# Patient Record
Sex: FEMALE | Race: OTHER | NOT HISPANIC OR LATINO | Employment: UNEMPLOYED | URBAN - METROPOLITAN AREA
[De-identification: names, ages, dates, MRNs, and addresses within clinical notes are randomized per-mention and may not be internally consistent; named-entity substitution may affect disease eponyms.]

---

## 2021-04-03 ENCOUNTER — HOSPITAL ENCOUNTER (EMERGENCY)
Facility: HOSPITAL | Age: 66
Discharge: HOME | End: 2021-04-03
Attending: EMERGENCY MEDICINE
Payer: MEDICARE

## 2021-04-03 VITALS
WEIGHT: 170 LBS | HEIGHT: 63 IN | SYSTOLIC BLOOD PRESSURE: 116 MMHG | BODY MASS INDEX: 30.12 KG/M2 | HEART RATE: 64 BPM | DIASTOLIC BLOOD PRESSURE: 73 MMHG | RESPIRATION RATE: 18 BRPM | TEMPERATURE: 99 F | OXYGEN SATURATION: 97 %

## 2021-04-03 DIAGNOSIS — M25.572 ACUTE LEFT ANKLE PAIN: Primary | ICD-10-CM

## 2021-04-03 DIAGNOSIS — M79.672 LEFT FOOT PAIN: ICD-10-CM

## 2021-04-03 DIAGNOSIS — S99.912A LEFT ANKLE INJURY: ICD-10-CM

## 2021-04-03 PROCEDURE — 25000003 PHARM REV CODE 250: Performed by: NURSE PRACTITIONER

## 2021-04-03 PROCEDURE — 99283 EMERGENCY DEPT VISIT LOW MDM: CPT | Mod: 25

## 2021-04-03 RX ORDER — ATORVASTATIN CALCIUM 40 MG/1
1 TABLET, FILM COATED ORAL
COMMUNITY
Start: 2021-02-08

## 2021-04-03 RX ORDER — FUROSEMIDE 20 MG/1
20 TABLET ORAL DAILY
COMMUNITY
Start: 2021-02-09

## 2021-04-03 RX ORDER — ENALAPRIL MALEATE 20 MG/1
1 TABLET ORAL
COMMUNITY
Start: 2021-02-08

## 2021-04-03 RX ORDER — OXYCODONE AND ACETAMINOPHEN 5; 325 MG/1; MG/1
1 TABLET ORAL
Status: COMPLETED | OUTPATIENT
Start: 2021-04-03 | End: 2021-04-03

## 2021-04-03 RX ORDER — OMEPRAZOLE 40 MG/1
1 CAPSULE, DELAYED RELEASE ORAL
COMMUNITY
Start: 2020-07-08

## 2021-04-03 RX ORDER — ATENOLOL 100 MG/1
1 TABLET ORAL
COMMUNITY
Start: 2021-02-08

## 2021-04-03 RX ORDER — METFORMIN HYDROCHLORIDE 1000 MG/1
1 TABLET ORAL
COMMUNITY
Start: 2021-02-08

## 2021-04-03 RX ORDER — EMPAGLIFLOZIN 10 MG/1
1 TABLET, FILM COATED ORAL
COMMUNITY
Start: 2021-02-08

## 2021-04-03 RX ORDER — GLIPIZIDE 10 MG/1
1 TABLET ORAL
COMMUNITY
Start: 2021-02-08

## 2021-04-03 RX ORDER — DULAGLUTIDE 1.5 MG/.5ML
1.5 INJECTION, SOLUTION SUBCUTANEOUS
COMMUNITY
Start: 2020-12-29

## 2021-04-03 RX ADMIN — OXYCODONE HYDROCHLORIDE AND ACETAMINOPHEN 1 TABLET: 5; 325 TABLET ORAL at 02:04

## 2021-04-07 ENCOUNTER — IMMUNIZATION (OUTPATIENT)
Dept: OBSTETRICS AND GYNECOLOGY | Facility: CLINIC | Age: 66
End: 2021-04-07
Payer: MEDICARE

## 2021-04-07 DIAGNOSIS — Z23 NEED FOR VACCINATION: Primary | ICD-10-CM

## 2021-04-07 PROCEDURE — 0001A COVID-19, MRNA, LNP-S, PF, 30 MCG/0.3 ML DOSE VACCINE: ICD-10-PCS | Mod: CV19,S$GLB,, | Performed by: FAMILY MEDICINE

## 2021-04-07 PROCEDURE — 91300 COVID-19, MRNA, LNP-S, PF, 30 MCG/0.3 ML DOSE VACCINE: ICD-10-PCS | Mod: S$GLB,,, | Performed by: FAMILY MEDICINE

## 2021-04-07 PROCEDURE — 91300 COVID-19, MRNA, LNP-S, PF, 30 MCG/0.3 ML DOSE VACCINE: CPT | Mod: S$GLB,,, | Performed by: FAMILY MEDICINE

## 2021-04-07 PROCEDURE — 0001A COVID-19, MRNA, LNP-S, PF, 30 MCG/0.3 ML DOSE VACCINE: CPT | Mod: CV19,S$GLB,, | Performed by: FAMILY MEDICINE

## 2021-04-12 ENCOUNTER — OFFICE VISIT (OUTPATIENT)
Dept: PODIATRY | Facility: CLINIC | Age: 66
End: 2021-04-12
Payer: MEDICARE

## 2021-04-12 VITALS
WEIGHT: 170 LBS | SYSTOLIC BLOOD PRESSURE: 156 MMHG | BODY MASS INDEX: 30.12 KG/M2 | HEIGHT: 63 IN | DIASTOLIC BLOOD PRESSURE: 90 MMHG | HEART RATE: 115 BPM

## 2021-04-12 DIAGNOSIS — E11.49 TYPE II DIABETES MELLITUS WITH NEUROLOGICAL MANIFESTATIONS: Primary | ICD-10-CM

## 2021-04-12 DIAGNOSIS — M25.572 CHRONIC PAIN OF LEFT ANKLE: ICD-10-CM

## 2021-04-12 DIAGNOSIS — G89.29 CHRONIC PAIN OF LEFT ANKLE: ICD-10-CM

## 2021-04-12 PROCEDURE — 99203 PR OFFICE/OUTPT VISIT, NEW, LEVL III, 30-44 MIN: ICD-10-PCS | Mod: S$PBB,,, | Performed by: PODIATRIST

## 2021-04-12 PROCEDURE — 99213 OFFICE O/P EST LOW 20 MIN: CPT | Mod: PBBFAC,PO | Performed by: PODIATRIST

## 2021-04-12 PROCEDURE — 99999 PR PBB SHADOW E&M-EST. PATIENT-LVL III: ICD-10-PCS | Mod: PBBFAC,,, | Performed by: PODIATRIST

## 2021-04-12 PROCEDURE — 99203 OFFICE O/P NEW LOW 30 MIN: CPT | Mod: S$PBB,,, | Performed by: PODIATRIST

## 2021-04-12 PROCEDURE — 99999 PR PBB SHADOW E&M-EST. PATIENT-LVL III: CPT | Mod: PBBFAC,,, | Performed by: PODIATRIST

## 2021-04-28 ENCOUNTER — IMMUNIZATION (OUTPATIENT)
Dept: OBSTETRICS AND GYNECOLOGY | Facility: CLINIC | Age: 66
End: 2021-04-28

## 2021-04-28 DIAGNOSIS — Z23 NEED FOR VACCINATION: Primary | ICD-10-CM

## 2021-04-28 PROCEDURE — 0002A COVID-19, MRNA, LNP-S, PF, 30 MCG/0.3 ML DOSE VACCINE: CPT | Mod: CV19,,, | Performed by: FAMILY MEDICINE

## 2021-04-28 PROCEDURE — 91300 COVID-19, MRNA, LNP-S, PF, 30 MCG/0.3 ML DOSE VACCINE: CPT | Mod: ,,, | Performed by: FAMILY MEDICINE

## 2021-04-28 PROCEDURE — 91300 COVID-19, MRNA, LNP-S, PF, 30 MCG/0.3 ML DOSE VACCINE: ICD-10-PCS | Mod: ,,, | Performed by: FAMILY MEDICINE

## 2021-04-28 PROCEDURE — 0002A COVID-19, MRNA, LNP-S, PF, 30 MCG/0.3 ML DOSE VACCINE: ICD-10-PCS | Mod: CV19,,, | Performed by: FAMILY MEDICINE

## 2024-11-18 ENCOUNTER — HOSPITAL ENCOUNTER (EMERGENCY)
Facility: HOSPITAL | Age: 69
Discharge: HOME | End: 2024-11-18
Attending: EMERGENCY MEDICINE
Payer: MEDICARE

## 2024-11-18 VITALS
HEART RATE: 67 BPM | SYSTOLIC BLOOD PRESSURE: 155 MMHG | TEMPERATURE: 99 F | HEIGHT: 63 IN | BODY MASS INDEX: 29.23 KG/M2 | OXYGEN SATURATION: 98 % | RESPIRATION RATE: 17 BRPM | WEIGHT: 165 LBS | DIASTOLIC BLOOD PRESSURE: 74 MMHG

## 2024-11-18 DIAGNOSIS — R10.30 LOWER ABDOMINAL PAIN: Primary | ICD-10-CM

## 2024-11-18 DIAGNOSIS — K59.00 CONSTIPATION, UNSPECIFIED CONSTIPATION TYPE: ICD-10-CM

## 2024-11-18 DIAGNOSIS — K64.4 EXTERNAL HEMORRHOIDS: ICD-10-CM

## 2024-11-18 DIAGNOSIS — K62.89 RECTAL PAIN: ICD-10-CM

## 2024-11-18 LAB
ALBUMIN SERPL BCP-MCNC: 3.8 G/DL (ref 3.5–5.2)
ALLENS TEST: ABNORMAL
ALP SERPL-CCNC: 73 U/L (ref 40–150)
ALT SERPL W/O P-5'-P-CCNC: 15 U/L (ref 10–44)
ANION GAP SERPL CALC-SCNC: 14 MMOL/L (ref 8–16)
ANION GAP SERPL CALC-SCNC: 9 MMOL/L (ref 8–16)
AST SERPL-CCNC: 17 U/L (ref 10–40)
BACTERIA #/AREA URNS HPF: ABNORMAL /HPF
BASOPHILS # BLD AUTO: 0.03 K/UL (ref 0–0.2)
BASOPHILS NFR BLD: 0.3 % (ref 0–1.9)
BILIRUB SERPL-MCNC: 0.5 MG/DL (ref 0.1–1)
BILIRUB UR QL STRIP: NEGATIVE
BUN SERPL-MCNC: 12 MG/DL (ref 8–23)
BUN SERPL-MCNC: 13 MG/DL (ref 6–30)
CALCIUM SERPL-MCNC: 9.8 MG/DL (ref 8.7–10.5)
CHLORIDE SERPL-SCNC: 103 MMOL/L (ref 95–110)
CHLORIDE SERPL-SCNC: 104 MMOL/L (ref 95–110)
CLARITY UR: CLEAR
CO2 SERPL-SCNC: 26 MMOL/L (ref 23–29)
COLOR UR: COLORLESS
CREAT SERPL-MCNC: 0.4 MG/DL (ref 0.5–1.4)
CREAT SERPL-MCNC: 0.7 MG/DL (ref 0.5–1.4)
DELSYS: ABNORMAL
DIFFERENTIAL METHOD BLD: NORMAL
EOSINOPHIL # BLD AUTO: 0.1 K/UL (ref 0–0.5)
EOSINOPHIL NFR BLD: 0.8 % (ref 0–8)
ERYTHROCYTE [DISTWIDTH] IN BLOOD BY AUTOMATED COUNT: 14.1 % (ref 11.5–14.5)
EST. GFR  (NO RACE VARIABLE): >60 ML/MIN/1.73 M^2
GLUCOSE SERPL-MCNC: 255 MG/DL (ref 70–110)
GLUCOSE SERPL-MCNC: 258 MG/DL (ref 70–110)
GLUCOSE UR QL STRIP: ABNORMAL
HCT VFR BLD AUTO: 43.7 % (ref 37–48.5)
HCT VFR BLD CALC: 46 %PCV (ref 36–54)
HGB BLD-MCNC: 14.6 G/DL (ref 12–16)
HGB UR QL STRIP: NEGATIVE
IMM GRANULOCYTES # BLD AUTO: 0.04 K/UL (ref 0–0.04)
IMM GRANULOCYTES NFR BLD AUTO: 0.4 % (ref 0–0.5)
KETONES UR QL STRIP: NEGATIVE
LEUKOCYTE ESTERASE UR QL STRIP: NEGATIVE
LIPASE SERPL-CCNC: 30 U/L (ref 4–60)
LYMPHOCYTES # BLD AUTO: 3.1 K/UL (ref 1–4.8)
LYMPHOCYTES NFR BLD: 31.9 % (ref 18–48)
MCH RBC QN AUTO: 29.4 PG (ref 27–31)
MCHC RBC AUTO-ENTMCNC: 33.4 G/DL (ref 32–36)
MCV RBC AUTO: 88 FL (ref 82–98)
MICROSCOPIC COMMENT: ABNORMAL
MODE: ABNORMAL
MONOCYTES # BLD AUTO: 0.6 K/UL (ref 0.3–1)
MONOCYTES NFR BLD: 6.4 % (ref 4–15)
NEUTROPHILS # BLD AUTO: 5.9 K/UL (ref 1.8–7.7)
NEUTROPHILS NFR BLD: 60.2 % (ref 38–73)
NITRITE UR QL STRIP: NEGATIVE
NRBC BLD-RTO: 0 /100 WBC
PH UR STRIP: 6 [PH] (ref 5–8)
PLATELET # BLD AUTO: 248 K/UL (ref 150–450)
PMV BLD AUTO: 10.7 FL (ref 9.2–12.9)
POC IONIZED CALCIUM: 1.3 MMOL/L (ref 1.06–1.42)
POC TCO2 (MEASURED): 26 MMOL/L (ref 23–29)
POTASSIUM BLD-SCNC: 3.9 MMOL/L (ref 3.5–5.1)
POTASSIUM SERPL-SCNC: 4 MMOL/L (ref 3.5–5.1)
PROT SERPL-MCNC: 7.8 G/DL (ref 6–8.4)
PROT UR QL STRIP: NEGATIVE
RBC # BLD AUTO: 4.97 M/UL (ref 4–5.4)
SAMPLE: ABNORMAL
SITE: ABNORMAL
SODIUM BLD-SCNC: 139 MMOL/L (ref 136–145)
SODIUM SERPL-SCNC: 139 MMOL/L (ref 136–145)
SP GR UR STRIP: >1.03 (ref 1–1.03)
URN SPEC COLLECT METH UR: ABNORMAL
UROBILINOGEN UR STRIP-ACNC: NEGATIVE EU/DL
WBC # BLD AUTO: 9.77 K/UL (ref 3.9–12.7)
WBC #/AREA URNS HPF: 6 /HPF (ref 0–5)
YEAST URNS QL MICRO: ABNORMAL

## 2024-11-18 PROCEDURE — 87086 URINE CULTURE/COLONY COUNT: CPT | Performed by: NURSE PRACTITIONER

## 2024-11-18 PROCEDURE — 82330 ASSAY OF CALCIUM: CPT

## 2024-11-18 PROCEDURE — 99900035 HC TECH TIME PER 15 MIN (STAT)

## 2024-11-18 PROCEDURE — 25000003 PHARM REV CODE 250: Performed by: NURSE PRACTITIONER

## 2024-11-18 PROCEDURE — 84132 ASSAY OF SERUM POTASSIUM: CPT

## 2024-11-18 PROCEDURE — 96361 HYDRATE IV INFUSION ADD-ON: CPT

## 2024-11-18 PROCEDURE — 85014 HEMATOCRIT: CPT

## 2024-11-18 PROCEDURE — 96374 THER/PROPH/DIAG INJ IV PUSH: CPT

## 2024-11-18 PROCEDURE — 84295 ASSAY OF SERUM SODIUM: CPT

## 2024-11-18 PROCEDURE — 82962 GLUCOSE BLOOD TEST: CPT

## 2024-11-18 PROCEDURE — 82565 ASSAY OF CREATININE: CPT

## 2024-11-18 PROCEDURE — 99285 EMERGENCY DEPT VISIT HI MDM: CPT | Mod: 25

## 2024-11-18 PROCEDURE — 63600175 PHARM REV CODE 636 W HCPCS: Performed by: NURSE PRACTITIONER

## 2024-11-18 PROCEDURE — 81000 URINALYSIS NONAUTO W/SCOPE: CPT | Performed by: NURSE PRACTITIONER

## 2024-11-18 PROCEDURE — 85025 COMPLETE CBC W/AUTO DIFF WBC: CPT | Performed by: NURSE PRACTITIONER

## 2024-11-18 PROCEDURE — 80053 COMPREHEN METABOLIC PANEL: CPT | Performed by: NURSE PRACTITIONER

## 2024-11-18 PROCEDURE — 25500020 PHARM REV CODE 255: Performed by: NURSE PRACTITIONER

## 2024-11-18 PROCEDURE — 83690 ASSAY OF LIPASE: CPT | Performed by: NURSE PRACTITIONER

## 2024-11-18 RX ORDER — HYDROCORTISONE 25 MG/G
CREAM TOPICAL 2 TIMES DAILY
Qty: 28 G | Refills: 0 | Status: SHIPPED | OUTPATIENT
Start: 2024-11-18

## 2024-11-18 RX ORDER — MORPHINE SULFATE 4 MG/ML
2 INJECTION, SOLUTION INTRAMUSCULAR; INTRAVENOUS
Status: COMPLETED | OUTPATIENT
Start: 2024-11-18 | End: 2024-11-18

## 2024-11-18 RX ORDER — HYDROCORTISONE 25 MG/G
CREAM TOPICAL
Status: COMPLETED | OUTPATIENT
Start: 2024-11-18 | End: 2024-11-18

## 2024-11-18 RX ORDER — POLYETHYLENE GLYCOL 3350 17 G/17G
17 POWDER, FOR SOLUTION ORAL DAILY
Qty: 238 G | Refills: 0 | Status: SHIPPED | OUTPATIENT
Start: 2024-11-18 | End: 2024-12-02

## 2024-11-18 RX ADMIN — IOHEXOL 75 ML: 350 INJECTION, SOLUTION INTRAVENOUS at 08:11

## 2024-11-18 RX ADMIN — MORPHINE SULFATE 2 MG: 4 INJECTION INTRAVENOUS at 08:11

## 2024-11-18 RX ADMIN — SODIUM CHLORIDE 500 ML: 9 INJECTION, SOLUTION INTRAVENOUS at 09:11

## 2024-11-18 RX ADMIN — HYDROCORTISONE: 25 CREAM TOPICAL at 08:11

## 2024-11-18 NOTE — ED PROVIDER NOTES
Encounter Date: 11/18/2024       History     Chief Complaint   Patient presents with    Constipation     Pt's daughter reports pt has not had a BM in 4 days and is having extreme pressure in her rectum; pt unable to sit flat due to pain and reports she tried to have BM but can't push anything out; pt restless/uncomfortable appearing in wheelchair     CC:  Constipation    HPI: Chelsea Geronimo, a 69 y.o. female presents to the ED with a 3 day history of constipation.  Patient's daughter reports that the patient is just traveled from [place] to her house.  Mother reports it is a 13 hour flight over 2 days.  Reported ongoing constipation since she left 4 days ago.  Patient reports pain in the lower abdomen, focused in the right lower.  Patient reports an urge to go but feels like something is stuck.  She has significant rectal pain.  Daughter reports attempted treatment with 2 doses of stool softener which she uses for her child unsuccessful and accommodation of all of oil, cinnamon, and orange p.o. by mouth.  Hysterectomy as her only known abdominal surgery.    There is no problem list on file for this patient.        The history is provided by the patient and a relative. The history is limited by a language barrier. A  was used ([employer] [id number] - Hebrew).     Review of patient's allergies indicates:  No Known Allergies  Past Medical History:   Diagnosis Date    Diabetes mellitus     Hyperlipidemia     Hypertension      History reviewed. No pertinent surgical history.  No family history on file.  Social History     Tobacco Use    Smoking status: Never    Smokeless tobacco: Never   Substance Use Topics    Alcohol use: Never    Drug use: Never     Review of Systems   Constitutional:  Negative for fever.   HENT:  Negative for congestion and trouble swallowing.    Respiratory:  Negative for cough and shortness of breath.    Cardiovascular:  Negative for chest pain.   Gastrointestinal:  Positive for abdominal  pain and constipation. Negative for blood in stool, nausea and vomiting.        (+) Rectal Pain   Genitourinary:  Negative for difficulty urinating and dysuria.   Musculoskeletal:  Negative for neck pain and neck stiffness.   Skin:  Negative for rash and wound.   Neurological:  Negative for syncope, weakness and headaches.   Psychiatric/Behavioral:  Negative for confusion.        Physical Exam     Initial Vitals [11/18/24 0649]   BP Pulse Resp Temp SpO2   (!) 153/65 70 (!) 22 99 °F (37.2 °C) 97 %      MAP       --         Physical Exam    Nursing note and vitals reviewed.  Constitutional: She appears well-developed and well-nourished. She is not diaphoretic. She is cooperative.  Non-toxic appearance. She does not have a sickly appearance. She does not appear ill. She appears distressed (Uncomfortable, lying on her left side).   HENT:   Head: Normocephalic and atraumatic.   Right Ear: External ear normal.   Left Ear: External ear normal.   Nose: Nose normal. Mouth/Throat: Mucous membranes are normal. No trismus in the jaw.   Eyes: Conjunctivae and EOM are normal.   Neck: Phonation normal. No tracheal deviation present.   Normal range of motion.  Cardiovascular:  Normal rate, regular rhythm and intact distal pulses.           Pulses:       Radial pulses are 2+ on the right side and 2+ on the left side.   Pulmonary/Chest: Effort normal and breath sounds normal. No accessory muscle usage or stridor. No tachypnea and no bradypnea. No respiratory distress. She has no wheezes. She has no rhonchi. She has no rales. She exhibits no tenderness.   Abdominal: She exhibits no distension. There is abdominal tenderness (Maximal RLQ) in the right lower quadrant, suprapubic area and left lower quadrant. There is no rebound and no guarding.   Genitourinary: Rectum:      Tenderness and external hemorrhoid present.      Genitourinary Comments: Rectal exam chaperoned by female RN.  Brown stool in the rectal vault.  No bleeding.   Enlarged external hemorrhoid as well as several smaller external hemorrhoids. No fecal mass palpable on rectal exam.      Musculoskeletal:         General: Normal range of motion.      Cervical back: Normal range of motion.     Neurological: She is alert and oriented to person, place, and time. She has normal strength. No sensory deficit. Coordination and gait normal. GCS score is 15. GCS eye subscore is 4. GCS verbal subscore is 5. GCS motor subscore is 6.   Skin: Skin is warm, dry and intact. Capillary refill takes less than 2 seconds. No bruising and no rash noted. No cyanosis or erythema. Nails show no clubbing.   Psychiatric: She has a normal mood and affect. Her behavior is normal. Judgment and thought content normal.         ED Course   Procedures  Labs Reviewed   COMPREHENSIVE METABOLIC PANEL - Abnormal       Result Value    Sodium 139      Potassium 4.0      Chloride 104      CO2 26      Glucose 258 (*)     BUN 12      Creatinine 0.7      Calcium 9.8      Total Protein 7.8      Albumin 3.8      Total Bilirubin 0.5      Alkaline Phosphatase 73      AST 17      ALT 15      eGFR >60      Anion Gap 9     URINALYSIS, REFLEX TO URINE CULTURE - Abnormal    Specimen UA Urine, Clean Catch      Color, UA Colorless (*)     Appearance, UA Clear      pH, UA 6.0      Specific Gravity, UA >1.030 (*)     Protein, UA Negative      Glucose, UA 4+ (*)     Ketones, UA Negative      Bilirubin (UA) Negative      Occult Blood UA Negative      Nitrite, UA Negative      Urobilinogen, UA Negative      Leukocytes, UA Negative      Narrative:     Specimen Source->Urine   URINALYSIS MICROSCOPIC - Abnormal    WBC, UA 6 (*)     Bacteria None      Yeast, UA None      Microscopic Comment SEE COMMENT      Narrative:     Specimen Source->Urine   ISTAT PROCEDURE - Abnormal    POC Glucose 255 (*)     POC BUN 13      POC Creatinine 0.4 (*)     POC Sodium 139      POC Potassium 3.9      POC Chloride 103      POC TCO2 (MEASURED) 26      POC Anion  Gap 14      POC Ionized Calcium 1.30      POC Hematocrit 46      Sample VENOUS      Site Other      Allens Test N/A      DelSys Room Air      Mode SPONT     CULTURE, URINE   CBC W/ AUTO DIFFERENTIAL    WBC 9.77      RBC 4.97      Hemoglobin 14.6      Hematocrit 43.7      MCV 88      MCH 29.4      MCHC 33.4      RDW 14.1      Platelets 248      MPV 10.7      Immature Granulocytes 0.4      Gran # (ANC) 5.9      Immature Grans (Abs) 0.04      Lymph # 3.1      Mono # 0.6      Eos # 0.1      Baso # 0.03      nRBC 0      Gran % 60.2      Lymph % 31.9      Mono % 6.4      Eosinophil % 0.8      Basophil % 0.3      Differential Method Automated     LIPASE    Lipase 30     ISTAT CHEM8          Imaging Results              CT Abdomen Pelvis With IV Contrast NO Oral Contrast (Final result)  Result time 11/18/24 09:37:40      Final result by Laura Rollins MD (11/18/24 09:37:40)                   Impression:      Moderate stool throughout the colon including rectum.  Small bowel feces sign, without small bowel distension.  These findings can be seen in the setting of delayed bowel transit/constipation.  Additionally, there is wall thickening of the rectum/anus with some mild adjacent fat stranding can relate to inflammatory or infectious etiology, these findings to be correlated clinically.      Electronically signed by: Laura Rollins MD  Date:    11/18/2024  Time:    09:37               Narrative:    EXAMINATION:  CT ABDOMEN PELVIS WITH IV CONTRAST    CLINICAL HISTORY:  RLQ abdominal pain (Age >= 14y);    TECHNIQUE:  Low dose axial images, sagittal and coronal reformations were obtained from the lung bases to the pubic symphysis following the IV administration of 75 mL of Omnipaque 350 and no oral contrast    COMPARISON:  None.    FINDINGS:  The bases demonstrate no significant abnormality.    The liver size is upper normal.  The liver demonstrates no focal abnormality.  The spleen is not enlarged.    The stomach is  unremarkable.  Pancreas demonstrates no significant abnormality.  Gallbladder is unremarkable.  There is no biliary ductal dilatation.  No adrenal mass is noted.  There is a small largely calcified 9 mm splenic artery aneurysm.    The abdominal aorta tapers normally without adjacent lymphadenopathy.  There is no pelvic lymphadenopathy.  Bladder is unremarkable.  Uterus is absent.    There is moderate stool within the rectum with rectal/anal wall thickening and mild adjacent fat stranding.  Moderate stool throughout the colon.  Appendix within normal limits.  No small bowel distension.  Mild small bowel feces sign.    The kidneys demonstrate no hydronephrosis or mass.                                       Medications   hydrocortisone 2.5 % rectal cream ( Rectal Given 11/18/24 0809)   morphine injection 2 mg (2 mg Intravenous Given 11/18/24 0803)   iohexoL (OMNIPAQUE 350) injection 75 mL (75 mLs Intravenous Given 11/18/24 0852)   sodium chloride 0.9% bolus 500 mL 500 mL (0 mLs Intravenous Stopped 11/18/24 1030)     Medical Decision Making       APC / Resident Notes:   This is an evaluation of a 69 y.o. female that presents to the Emergency Department for rectal pain, constipation, and abdominal pain. Physical Exam shows a non-toxic, afebrile, and uncomfortable appearing female.  Abdomen is soft with tenderness in lower abdomen, maximal tenderness in the right lower quadrant.  No peritoneal signs.  Rectal exam with enlarged external hemorrhoid as well as smaller non thrombosed flesh-colored hemorrhoids.  Soft brown stool in the vault.  No hard fecal mass on digital rectal exam.  Given the patient's tenderness in lower abdomen specifically right lower quadrant will CT scan to rule out intra-abdominal cause.  Vital Signs Are Reassuring. RESULTS:  CBC without leukocytosis or anemia.  Normal platelet count.  CMP with glucose elevated to 58, otherwise unremarkable.  Normal lipase.  Urinalysis with 4+ sugar in 6 white cells  without bacteria or leukocytes.  Will culture.  CT with moderate stool throughout the colon, small bowel feces sign without bowel distention, can be seen in delayed bowel transit constipation.  Thickening of the rectum anus with some mild adjacent fat stranding.  Patient without any clinical evidence of infectious etiology, suspect inflammatory.    My overall impression is lower abdominal pain with constipation, rectal pain secondary to hemorrhoids. Given the exam and laboratory studies, I considered, but at this time, do not suspect an appendicitis, ischemic bowel, AAA/dissection, bowel perforation, bowel obstruction, pancreatitis,pyelonephritis, kidney stone, diverticulitis, or cholecystitis.     ED Course:  Patient provided hydrocortisone rectal cream and a very small dose of morphine for pain, or pain is improved and she does appear to be feeling much better during my check ends. D/C Meds:  Hydrocortisone rectal cream, MiraLax. Additional D/C Information: Abdominal Pain Precautions, hemorrhoid discharge instructions. The diagnosis, treatment plan, instructions for follow-up and reevaluation with primary care as well as ED return precautions were discussed and understanding was verbalized. All questions or concerns have been addressed.  This case was discussed with Dr. Moctezuma who is in agreement with my assessment and plan.  JUDITH Hernandez, VIKTORIYAP-C                                 Clinical Impression:  Final diagnoses:  [K62.89] Rectal pain  [K64.4] External hemorrhoids  [K59.00] Constipation, unspecified constipation type  [R10.30] Lower abdominal pain (Primary)          ED Disposition Condition    Discharge Stable          ED Prescriptions       Medication Sig Dispense Start Date End Date Auth. Provider    polyethylene glycol (GLYCOLAX) 17 gram/dose powder Take 17 g by mouth once daily. for 14 days 238 g 11/18/2024 12/2/2024 Isiah Zarco, VIKTORIYAP    hydrocortisone 2.5 % cream Apply topically 2 (two) times  daily. Apply to Rectum to help with Hemorrhoid pain. 28 g 11/18/2024 -- Isiah Zarco, CINDY          Follow-up Information       Follow up With Specialties Details Why Contact Info    Your Primary Care Doctor  Schedule an appointment as soon as possible for a visit  Please call and schedule an appointment for follow up this week.     [facility] Dept Emergency Medicine Go to  If symptoms worsen [address] [facility] [place] [id number] [phone]             Isiah Zarco, VIKTORIYAP  11/18/24 1329

## 2024-11-18 NOTE — DISCHARGE INSTRUCTIONS
Please return to the Emergency Department for any new or worsening symptoms including: worsening abdominal pain, dark\black\bloody bowel movements, vomiting blood, hard abdomen, fever, chest pain, shortness of breath, loss of consciousness or any other concerns.     Please follow up with your Primary Care Provider within in the week. If you do not have a Primary Care Provider, you may contact the one listed on your discharge paperwork or you may also call the [facility] Appointment Desk at [phone] to schedule an appointment with a Primary Care Provider.

## 2024-11-20 LAB — BACTERIA UR CULT: NORMAL

## 2025-03-13 DIAGNOSIS — M25.50 DIFFUSE ARTHRALGIA: Primary | ICD-10-CM

## 2025-03-13 DIAGNOSIS — R53.1 GENERALIZED WEAKNESS: ICD-10-CM

## 2025-04-10 ENCOUNTER — CLINICAL SUPPORT (OUTPATIENT)
Dept: REHABILITATION | Facility: HOSPITAL | Age: 70
End: 2025-04-10
Payer: MEDICARE

## 2025-04-10 DIAGNOSIS — R52 GENERALIZED PAIN: Primary | ICD-10-CM

## 2025-04-10 DIAGNOSIS — M25.50 DIFFUSE ARTHRALGIA: ICD-10-CM

## 2025-04-10 DIAGNOSIS — R53.1 GENERALIZED WEAKNESS: ICD-10-CM

## 2025-04-10 PROCEDURE — 97161 PT EVAL LOW COMPLEX 20 MIN: CPT | Mod: PN

## 2025-04-10 NOTE — LETTER
April 10, 2025  CINDY Almaraz  [address] [facility]  [place] [address]    To whom it may concern,     The attached plan of care is being sent to you for review and reference.    You may indicate your approval by signing the document electronically, or by faxing/mailing a signed copy of the final page of this document back to the attention of David Mitchell, PT:         Plan of Care 4/10/25   Effective from: 4/10/2025  Effective to: 7/3/2025    Active  Plan ID: [id number]           Participants as of 4/10/2025    Name Type Comments Contact Info    CINDY Almaraz Referring Provider  [phone]      Last Plan Note     Author: David Mitchell, PT Status: Incomplete Last edited: 4/10/2025 12:00 PM         Outpatient Rehab    Physical Therapy Evaluation    Patient Name: Chelsea Geronimo  MRN: [id number]  YOB: 1955  Encounter Date: 4/10/2025    Therapy Diagnosis:   Encounter Diagnoses   Name Primary?    Diffuse arthralgia     Generalized weakness     Generalized pain Yes     Physician: Bharat Arechiga FNP    Physician Orders: Eval and Treat  Medical Diagnosis: Diffuse arthralgia  Generalized weakness    Visit # / Visits Authorized:  1 / 1  Insurance Authorization Period: 3/13/2025 to 12/31/2025  Date of Evaluation: 4/10/2025  Plan of Care Certification: 4/10/2025 to 7/3/25     Time In: 1200   Time Out: 1240  Total Time: 40   Total Billable Time: 40    Intake Outcome Measure for FOTO Survey    Therapist reviewed FOTO scores for Chelsea Geronimo on 4/10/2025.   FOTO report - see Media section or FOTO account episode details.     Intake Score: 38%         Subjective  History of Present Illness  Chelsea is a 69 y.o. female who reports to physical therapy with a chief concern of neck, shoulders, hands, arm, knee pain.     The patient reports a medical diagnosis of M25.50 (ICD-10-CM) - Diffuse arthralgia  R53.1 (ICD-10-CM) - Generalized weakness.    Diagnostic tests related to this condition: X-ray.    X-Ray Details: FINDINGS:   Straightening of the cervical alignment. Mild retrolisthesis of C2 on C3. Anterolisthesis noted of C4 on C5. Significant disc space narrowing identified at the C2-3 level.  There is no definite acute fracture. The odontoid appears intact.    History of Present Condition/Illness: Pt states that she speaks Telugu. Daughter will help her at home and community. Pt states she fall down 6 months ago. She has neck, shoulders, arms, hands and knee pain. She had fall 6 months ago. Pt states she spend most of day at home. She does not use SPC or RW. She states her hands hurts. She feels hand weakness. She has been dropping objects with her hands. She feels numbness and pain in the hands. She feels B knee weakness.     Activities of Daily Living  Social history was obtained from Patient.               Previously independent with activities of daily living? Yes     Currently independent with activities of daily living? No  Activities currently needing assistance include Dressing - upper body, Dressing - lower body, and Functional mobility.        Previously independent with instrumental activities of daily living? Yes     Currently independent with instrumental activities of daily living? No  Activities currently needing assistance include: Grocery/shopping and Community mobility.            Pain     Patient reports a current pain level of 7/10. Pain at best is reported as 4/10. Pain at worst is reported as 10/10.   Location: Neck, shoulders, arms, and hands, knee, back.  Clinical Progression (since onset): Stable  Pain Qualities: Aching, Burning, Dull, Sharp, Throbbing  Pain-Relieving Factors: Other (Comment)  Other Pain-Relieving Factors: none  Pain-Aggravating Factors: Bending, Cooking, Lifting, Holding objects, Movement, Reaching, Sitting, Rotation, Squatting, Stair climbing, Standing, Stretching, Walking         Treatment History  Treatments  Previously Received Treatments: No  Currently  Receiving Treatments: No      Past Medical History/Physical Systems Review:   Chelsea Geronimo  has a past medical history of Diabetes mellitus, Hyperlipidemia, and Hypertension.    Chelsea Geronimo  has no past surgical history on file.    Chelsea has a current medication list which includes the following prescription(s): atenolol, atorvastatin, trulicity, jardiance, enalapril, furosemide, glipizide, hydrocortisone, metformin, and omeprazole.    Review of patient's allergies indicates:  No Known Allergies     Objective  Posture    Swayback is observed.   Shoulders are Rounded.             Subcranial Range of Motion   Active Restricted? Passive Restricted? Pain   Flexion         Protraction         Retraction           Cervical Range of Motion   Active (deg) Passive (deg) Pain   Flexion  (Mod loss)   Yes   Extension  (Mod loss)   Yes   Right Lateral Flexion  (Mod loss)   Yes   Right Rotation  (Mod loss)   Yes   Left Lateral Flexion  (Mod loss)   Yes   Left Rotation  (Mod loss)   Yes          Shoulder Range of Motion  Right Shoulder   Active (deg) Passive (deg) Pain   Flexion  (WFL)   Yes   Extension         Scaption         ABduction  (WFL)   Yes   ADduction         Horizontal ABduction         Horizontal ADduction         External Rotation (Shoulder ABducted 0 degrees)  (WFL)   Yes   External Rotation (Shoulder ABducted 45 degrees)         External Rotation (Shoulder ABducted 90 degrees)         Internal Rotation (Shoulder ABducted 0 degrees)  (WFL)   Yes   Internal Rotation (Shoulder ABducted 45 degrees)         Internal Rotation (Shoulder ABducted 90 degrees)           Left Shoulder   Active (deg) Passive (deg) Pain   Flexion  (WFL)   Yes   Extension         Scaption         ABduction  (WFL)       ADduction         Horizontal ABduction         Horizontal ADduction         External Rotation (Shoulder ABducted 0 degrees)  (WFL)   Yes   External Rotation (Shoulder ABducted 45 degrees)         External Rotation (Shoulder  ABducted 90 degrees)         Internal Rotation (Shoulder ABducted 0 degrees)  (WFL)   Yes   Internal Rotation (Shoulder ABducted 45 degrees)         Internal Rotation (Shoulder ABducted 90 degrees)                       Shoulder Strength - Planes of Motion   Right Strength Right Pain Left Strength Left  Pain   Flexion           Extension           ABduction           ADduction           Horizontal ABduction           Horizontal ADduction           Internal Rotation 0°           Internal Rotation 90°           External Rotation 0°           External Rotation 90°               Shoulder Strength - Rotator Cuff Muscles   Right Strength Right Pain Left Strength Left  Pain   Supraspinatus 4- Yes 4-     Infraspinatus 4- Yes 4-     Teres Minor 4- Yes 4-     Subscapularis 4- Yes 4-            Hip Strength - Planes of Motion   Right Strength Right Pain Left Strength Left  Pain   Flexion (L2) 3+   3+     Extension 3+   3+     ABduction 3+   3+     ADduction 3+   3+     Internal Rotation 3+   3+     External Rotation 3+   3+         Knee Strength   Right Strength Right Pain Left Strength Left  Pain   Flexion (S2) 3+   3+     Prone Flexion 3+   3+     Extension (L3) 3+   3+            Ankle/Foot Strength - Planes of Motion   Right Strength Right Pain Left Strength Left  Pain   Dorsiflexion (L4) 3+ Yes 3+     Plantar Flexion (S1) 3+   3+     Inversion 3+ Yes 3+     Eversion 3+ Yes 3+     Great Toe Flexion 3+   3+     Great Toe Extension (L5) 3+   3+     Lesser Toes Flexion 3+   3+     Lesser Toes Extension 3+   3+               Fall Risk  Functional mobility test results suggest the patient is: At Risk for Falls  Four Stage Balance Test  Narrow Base of Support: 20 sec sec  Tandem Stand - Right Foot in Front: 4 sec     Semi-Tandem Stand - Right Foot in Front: 6 sec                Timed Up & Go (TUG)  Time: 13 seconds     An older adult who takes >=12 seconds to complete the TUG is at risk for falling.       Sit to Stand  Testing  The patient completed 5 sit to stand transfers in 28 sec.                Gait Analysis  Base of Support: Narrow  Gait Pattern: Antalgic  Walking Speed: Decreased    Right Side Walking Observations  Decreased: Stance Time, Swing Time, and Step Length       Left Side Walking Observations  Decreased: Stance Time, Swing Time, and Step Length            Treatment:       Time Entry(in minutes):  PT Evaluation (Low) Time Entry: 40    Assessment & Plan  Assessment  Chelsea presents with a condition of Low complexity.   Presentation of Symptoms: Stable  Will Comorbidities Impact Care: No       Functional Limitations: Activity tolerance, Carrying objects, Driving, Gait limitations, Functional mobility, Manipulating objects, Pain when reaching, Pain with ADLs/IADLs, Reaching, Squatting, Transfers, Increased risk of fall  Impairments: Abnormal gait, Abnormal muscle firing, Activity intolerance, Impaired balance, Impaired physical strength, Lack of appropriate home exercise program, Pain with functional activity  Personal Factors Affecting Prognosis: Pain, Physical limitations, Transportation, Schedule    Patient Goal for Therapy (PT): decrease pain  Prognosis: Fair  Assessment Details: Pt presents with signs and symptoms consistent with referring diagnosis. Pt wants to work on cervical, shoulders, arms, back, knee and ankle pain today. Evaluation has determined a decrease in functional status and subjective and objective deficits that can be addressed by physical therapy intervention. Pt demonstrates pain limiting functional activities. Decreased flexibility and strength limiting normal movement patterns. Decreased postural strength and awareness. Decreased participation in functional and recreational activities. Subjective and objective measures are addressed by goals in the plan of care.  Patient/family are involved in the development of these goals. Patient/family are educated about current injury and treatment. Pt  demonstrates no additional cultural, spiritual or educational need and currently has no barriers to learning. Pt responded well to treatment today. Pt is a good candidate for skilled physical therapy intervention and has a good prognosis and is motivated to return to functional and  recreational activities.     Plan  From a physical therapy perspective, the patient would benefit from: Skilled Rehab Services    Planned therapy interventions include: Therapeutic exercise, Therapeutic activities, Neuromuscular re-education, Manual therapy, ADLs/IADLs, and Gait training.            Visit Frequency: 2 times Per Week for 12 Weeks.       This plan was discussed with Patient.   Discussion participants: Agreed Upon Plan of Care             Patient's spiritual, cultural, and educational needs considered and patient agreeable to plan of care and goals.     Education  Education was done with Patient. The patient's learning style includes Listening, Demonstration, and Pictures/video. The patient Demonstrates understanding and Verbalizes understanding.                 Goals:   Active       LTGs        Increase ROM to allow improved joint biomechanics during functional activities.          Start:  04/10/25    Expected End:  07/03/25            2. Independent with home exercise program.         Start:  04/10/25    Expected End:  07/03/25            3. Full return to functional activities with manageable complaints.         Start:  04/10/25    Expected End:  07/03/25            4. Patient to demonstrate improved posture and body mechanics.         Start:  04/10/25    Expected End:  07/03/25            5.Decrease pain by 75% during functional activities.         Start:  04/10/25    Expected End:  07/03/25               STGs       Pt to increase strength by a 1/2 grade of muscles test to allow for improvement in functional activities such as performing chores.          Start:  04/10/25    Expected End:  07/03/25            2. Pt to  improve range of motion by 25% to allow for improved functional mobility to allow for improvement in IADL's.         Start:  04/10/25    Expected End:  07/03/25            3. Pt to report compliance with HEP and demonstrate proper exercise technique to PT to show competence with self management of condition.         Start:  04/10/25    Expected End:  07/03/25            4. Decrease pain by 25% during functional activities.         Start:  04/10/25    Expected End:  07/03/25                David Mitchell PT               Sincerely,      David Mitchell PT  [facility]                                                            Dear David Mitchell, PT,    RE: Ms. Chelsea Geronimo, MRN: [id number]    I certify that I have reviewed the attached plan of care and agree to the details within.        ___________________________  ___________________________  Provider Printed Name   Provider Signed Name      ___________________________  Date and Time

## 2025-04-10 NOTE — PROGRESS NOTES
Outpatient Rehab    Physical Therapy Evaluation    Patient Name: Chelsea Geronimo  MRN: [id number]  YOB: 1955  Encounter Date: 4/10/2025    Therapy Diagnosis:   Encounter Diagnoses   Name Primary?    Diffuse arthralgia     Generalized weakness     Generalized pain Yes     Physician: Bharat Arechiga FNP    Physician Orders: Eval and Treat  Medical Diagnosis: Diffuse arthralgia  Generalized weakness    Visit # / Visits Authorized:  1 / 1  Insurance Authorization Period: 3/13/2025 to 12/31/2025  Date of Evaluation: 4/10/2025  Plan of Care Certification: 4/10/2025 to 7/3/25     Time In: 1200   Time Out: 1240  Total Time: 40   Total Billable Time: 40    Intake Outcome Measure for FOTO Survey    Therapist reviewed FOTO scores for Chelsea Geronimo on 4/10/2025.   FOTO report - see Media section or FOTO account episode details.     Intake Score: 38%         Subjective   History of Present Illness  Chelsea is a 69 y.o. female who reports to physical therapy with a chief concern of neck, shoulders, hands, arm, knee pain.     The patient reports a medical diagnosis of M25.50 (ICD-10-CM) - Diffuse arthralgia  R53.1 (ICD-10-CM) - Generalized weakness.    Diagnostic tests related to this condition: X-ray.   X-Ray Details: FINDINGS:   Straightening of the cervical alignment. Mild retrolisthesis of C2 on C3. Anterolisthesis noted of C4 on C5. Significant disc space narrowing identified at the C2-3 level.  There is no definite acute fracture. The odontoid appears intact.    History of Present Condition/Illness: Pt states that she speaks Macedonian. Daughter will help her at home and community. Pt states she fall down 6 months ago. She has neck, shoulders, arms, hands and knee pain. She had fall 6 months ago. Pt states she spend most of day at home. She does not use SPC or RW. She states her hands hurts. She feels hand weakness. She has been dropping objects with her hands. She feels numbness and pain in the hands. She feels B  knee weakness.     Activities of Daily Living  Social history was obtained from Patient.               Previously independent with activities of daily living? Yes     Currently independent with activities of daily living? No  Activities currently needing assistance include Dressing - upper body, Dressing - lower body, and Functional mobility.        Previously independent with instrumental activities of daily living? Yes     Currently independent with instrumental activities of daily living? No  Activities currently needing assistance include: Grocery/shopping and Community mobility.            Pain     Patient reports a current pain level of 7/10. Pain at best is reported as 4/10. Pain at worst is reported as 10/10.   Location: Neck, shoulders, arms, and hands, knee, back.  Clinical Progression (since onset): Stable  Pain Qualities: Aching, Burning, Dull, Sharp, Throbbing  Pain-Relieving Factors: Other (Comment)  Other Pain-Relieving Factors: none  Pain-Aggravating Factors: Bending, Cooking, Lifting, Holding objects, Movement, Reaching, Sitting, Rotation, Squatting, Stair climbing, Standing, Stretching, Walking         Treatment History  Treatments  Previously Received Treatments: No  Currently Receiving Treatments: No      Past Medical History/Physical Systems Review:   Chelsea Geronimo  has a past medical history of Diabetes mellitus, Hyperlipidemia, and Hypertension.    Chelsea Geronimo  has no past surgical history on file.    Chelsea has a current medication list which includes the following prescription(s): atenolol, atorvastatin, trulicity, jardiance, enalapril, furosemide, glipizide, hydrocortisone, metformin, and omeprazole.    Review of patient's allergies indicates:  No Known Allergies     Objective   Posture    Swayback is observed.   Shoulders are Rounded.             Subcranial Range of Motion   Active Restricted? Passive Restricted? Pain   Flexion         Protraction         Retraction           Cervical  Range of Motion   Active (deg) Passive (deg) Pain   Flexion  (Mod loss)   Yes   Extension  (Mod loss)   Yes   Right Lateral Flexion  (Mod loss)   Yes   Right Rotation  (Mod loss)   Yes   Left Lateral Flexion  (Mod loss)   Yes   Left Rotation  (Mod loss)   Yes          Shoulder Range of Motion  Right Shoulder   Active (deg) Passive (deg) Pain   Flexion  (WFL)   Yes   Extension         Scaption         ABduction  (WFL)   Yes   ADduction         Horizontal ABduction         Horizontal ADduction         External Rotation (Shoulder ABducted 0 degrees)  (WFL)   Yes   External Rotation (Shoulder ABducted 45 degrees)         External Rotation (Shoulder ABducted 90 degrees)         Internal Rotation (Shoulder ABducted 0 degrees)  (WFL)   Yes   Internal Rotation (Shoulder ABducted 45 degrees)         Internal Rotation (Shoulder ABducted 90 degrees)           Left Shoulder   Active (deg) Passive (deg) Pain   Flexion  (WFL)   Yes   Extension         Scaption         ABduction  (WFL)       ADduction         Horizontal ABduction         Horizontal ADduction         External Rotation (Shoulder ABducted 0 degrees)  (WFL)   Yes   External Rotation (Shoulder ABducted 45 degrees)         External Rotation (Shoulder ABducted 90 degrees)         Internal Rotation (Shoulder ABducted 0 degrees)  (WFL)   Yes   Internal Rotation (Shoulder ABducted 45 degrees)         Internal Rotation (Shoulder ABducted 90 degrees)                       Shoulder Strength - Planes of Motion   Right Strength Right Pain Left Strength Left  Pain   Flexion           Extension           ABduction           ADduction           Horizontal ABduction           Horizontal ADduction           Internal Rotation 0°           Internal Rotation 90°           External Rotation 0°           External Rotation 90°               Shoulder Strength - Rotator Cuff Muscles   Right Strength Right Pain Left Strength Left  Pain   Supraspinatus 4- Yes 4-     Infraspinatus 4- Yes 4-      Teres Minor 4- Yes 4-     Subscapularis 4- Yes 4-            Hip Strength - Planes of Motion   Right Strength Right Pain Left Strength Left  Pain   Flexion (L2) 3+   3+     Extension 3+   3+     ABduction 3+   3+     ADduction 3+   3+     Internal Rotation 3+   3+     External Rotation 3+   3+         Knee Strength   Right Strength Right Pain Left Strength Left  Pain   Flexion (S2) 3+   3+     Prone Flexion 3+   3+     Extension (L3) 3+   3+            Ankle/Foot Strength - Planes of Motion   Right Strength Right Pain Left Strength Left  Pain   Dorsiflexion (L4) 3+ Yes 3+     Plantar Flexion (S1) 3+   3+     Inversion 3+ Yes 3+     Eversion 3+ Yes 3+     Great Toe Flexion 3+   3+     Great Toe Extension (L5) 3+   3+     Lesser Toes Flexion 3+   3+     Lesser Toes Extension 3+   3+               Fall Risk  Functional mobility test results suggest the patient is: At Risk for Falls  Four Stage Balance Test  Narrow Base of Support: 20 sec sec  Tandem Stand - Right Foot in Front: 4 sec     Semi-Tandem Stand - Right Foot in Front: 6 sec                Timed Up & Go (TUG)  Time: 13 seconds     An older adult who takes >=12 seconds to complete the TUG is at risk for falling.       Sit to Stand Testing  The patient completed 5 sit to stand transfers in 28 sec.                Gait Analysis  Base of Support: Narrow  Gait Pattern: Antalgic  Walking Speed: Decreased    Right Side Walking Observations  Decreased: Stance Time, Swing Time, and Step Length       Left Side Walking Observations  Decreased: Stance Time, Swing Time, and Step Length            Treatment:       Time Entry(in minutes):  PT Evaluation (Low) Time Entry: 40    Assessment & Plan   Assessment  Chelsae presents with a condition of Low complexity.   Presentation of Symptoms: Stable  Will Comorbidities Impact Care: No       Functional Limitations: Activity tolerance, Carrying objects, Driving, Gait limitations, Functional mobility, Manipulating objects, Pain when  reaching, Pain with ADLs/IADLs, Reaching, Squatting, Transfers, Increased risk of fall  Impairments: Abnormal gait, Abnormal muscle firing, Activity intolerance, Impaired balance, Impaired physical strength, Lack of appropriate home exercise program, Pain with functional activity  Personal Factors Affecting Prognosis: Pain, Physical limitations, Transportation, Schedule    Patient Goal for Therapy (PT): decrease pain  Prognosis: Fair  Assessment Details: Pt presents with signs and symptoms consistent with referring diagnosis. Pt wants to work on cervical, shoulders, arms, back, knee and ankle pain today. Evaluation has determined a decrease in functional status and subjective and objective deficits that can be addressed by physical therapy intervention. Pt demonstrates pain limiting functional activities. Decreased flexibility and strength limiting normal movement patterns. Decreased postural strength and awareness. Decreased participation in functional and recreational activities. Subjective and objective measures are addressed by goals in the plan of care.  Patient/family are involved in the development of these goals. Patient/family are educated about current injury and treatment. Pt demonstrates no additional cultural, spiritual or educational need and currently has no barriers to learning. Pt responded well to treatment today. Pt is a good candidate for skilled physical therapy intervention and has a good prognosis and is motivated to return to functional and  recreational activities.     Plan  From a physical therapy perspective, the patient would benefit from: Skilled Rehab Services    Planned therapy interventions include: Therapeutic exercise, Therapeutic activities, Neuromuscular re-education, Manual therapy, ADLs/IADLs, and Gait training.            Visit Frequency: 2 times Per Week for 12 Weeks.       This plan was discussed with Patient.   Discussion participants: Agreed Upon Plan of Care              Patient's spiritual, cultural, and educational needs considered and patient agreeable to plan of care and goals.     Education  Education was done with Patient. The patient's learning style includes Listening, Demonstration, and Pictures/video. The patient Demonstrates understanding and Verbalizes understanding.                 Goals:   Active       LTGs        Increase ROM to allow improved joint biomechanics during functional activities.          Start:  04/10/25    Expected End:  07/03/25            2. Independent with home exercise program.         Start:  04/10/25    Expected End:  07/03/25            3. Full return to functional activities with manageable complaints.         Start:  04/10/25    Expected End:  07/03/25            4. Patient to demonstrate improved posture and body mechanics.         Start:  04/10/25    Expected End:  07/03/25            5.Decrease pain by 75% during functional activities.         Start:  04/10/25    Expected End:  07/03/25               STGs       Pt to increase strength by a 1/2 grade of muscles test to allow for improvement in functional activities such as performing chores.          Start:  04/10/25    Expected End:  07/03/25            2. Pt to improve range of motion by 25% to allow for improved functional mobility to allow for improvement in IADL's.         Start:  04/10/25    Expected End:  07/03/25            3. Pt to report compliance with HEP and demonstrate proper exercise technique to PT to show competence with self management of condition.         Start:  04/10/25    Expected End:  07/03/25            4. Decrease pain by 25% during functional activities.         Start:  04/10/25    Expected End:  07/03/25                David Mitchell, PT

## 2025-05-01 ENCOUNTER — CLINICAL SUPPORT (OUTPATIENT)
Dept: REHABILITATION | Facility: HOSPITAL | Age: 70
End: 2025-05-01
Payer: MEDICARE

## 2025-05-01 DIAGNOSIS — R52 GENERALIZED PAIN: Primary | ICD-10-CM

## 2025-05-01 PROCEDURE — 97530 THERAPEUTIC ACTIVITIES: CPT | Mod: PN

## 2025-05-01 PROCEDURE — 97112 NEUROMUSCULAR REEDUCATION: CPT | Mod: PN

## 2025-05-01 PROCEDURE — 97110 THERAPEUTIC EXERCISES: CPT | Mod: PN

## 2025-05-01 NOTE — PROGRESS NOTES
Outpatient Rehab    Physical Therapy Visit    Patient Name: Chelsea Geronimo  MRN: [id number]  YOB: 1955  Encounter Date: 5/1/2025    Therapy Diagnosis:   Encounter Diagnosis   Name Primary?    Generalized pain Yes     Physician: Bharat Arechiga FNP    Physician Orders: Eval and Treat  Medical Diagnosis: Diffuse arthralgia  Generalized weakness    Visit # / Visits Authorized:  1 / 20  Insurance Authorization Period: 4/10/2025 to 12/31/2025  Date of Evaluation: 4/10/2025  Plan of Care Certification: 4/10/2025 to 7/3/25      PT/PTA:     Number of PTA visits since last PT visit:   Time In: 1100   Time Out: 1153  Total Time: 53   Total Billable Time: 53    FOTO:  Intake Score:  %  Survey Score 1:  %  Survey Score 2:  %         Subjective   She is feeling the same as intial eval.  Pain reported as 8/10.      Objective            Treatment:  Therapeutic Exercise  TE 1: Pulleys flexion AAROM 2 min  TE 2: Pulleys abd AAROM 2 min  TE 3: supine chest press AAROM 1# wand 2x10  TE 4: supine shoulder flexion 1# wand AAROM 2x10  Balance/Neuromuscular Re-Education  NMR 1: supine cervcial rotation 2x10  NMR 2: supine cervical flexion/extension 2x10  NMR 3: quad sets 2x10 hold 5 sec  NMR 4: SAQ 2x10 hold 2 sec  NMR 5: HL hip abd YTB 2x10  NMR 6: HL hip add 2x10  Therapeutic Activity  TA 1: Nustep 10 min    Time Entry(in minutes):  Neuromuscular Re-Education Time Entry: 25  Therapeutic Activity Time Entry: 10  Therapeutic Exercise Time Entry: 18    Assessment & Plan   Assessment: Pt presented to first f/u. Pt cont with cervical, shoulder and knee pain. We went over new exericses today. HEP was given today. Pt still have pain. Daughter was helping with interpretation. Pt required mod v/c's to perform exericses with proepr form and proper muscles activation. Pt still have decrease postural msucels, deep cervical flexion and decrease quad muscles motor control.       Patient will continue to benefit from skilled outpatient  physical therapy to address the deficits listed in the problem list box on initial evaluation, provide pt/family education and to maximize pt's level of independence in the home and community environment.     Patient's spiritual, cultural, and educational needs considered and patient agreeable to plan of care and goals.           Plan: Cont with POC    Goals:   Active       LTGs        Increase ROM to allow improved joint biomechanics during functional activities.          Start:  04/10/25    Expected End:  07/03/25            2. Independent with home exercise program.         Start:  04/10/25    Expected End:  07/03/25            3. Full return to functional activities with manageable complaints.         Start:  04/10/25    Expected End:  07/03/25            4. Patient to demonstrate improved posture and body mechanics.         Start:  04/10/25    Expected End:  07/03/25            5.Decrease pain by 75% during functional activities.         Start:  04/10/25    Expected End:  07/03/25               STGs       Pt to increase strength by a 1/2 grade of muscles test to allow for improvement in functional activities such as performing chores.          Start:  04/10/25    Expected End:  07/03/25            2. Pt to improve range of motion by 25% to allow for improved functional mobility to allow for improvement in IADL's.         Start:  04/10/25    Expected End:  07/03/25            3. Pt to report compliance with HEP and demonstrate proper exercise technique to PT to show competence with self management of condition.         Start:  04/10/25    Expected End:  07/03/25            4. Decrease pain by 25% during functional activities.         Start:  04/10/25    Expected End:  07/03/25                David Mitchell, PT

## 2025-05-12 ENCOUNTER — CLINICAL SUPPORT (OUTPATIENT)
Dept: REHABILITATION | Facility: HOSPITAL | Age: 70
End: 2025-05-12
Payer: MEDICARE

## 2025-05-12 DIAGNOSIS — M25.50 DIFFUSE ARTHRALGIA: ICD-10-CM

## 2025-05-12 DIAGNOSIS — R53.1 GENERALIZED WEAKNESS: ICD-10-CM

## 2025-05-12 DIAGNOSIS — R52 GENERALIZED PAIN: Primary | ICD-10-CM

## 2025-05-12 PROCEDURE — 97112 NEUROMUSCULAR REEDUCATION: CPT | Mod: PN,CQ

## 2025-05-12 NOTE — PROGRESS NOTES
Outpatient Rehab    Physical Therapy Visit    Patient Name: Chelsea Geronimo  MRN: [id number]  YOB: 1955  Encounter Date: 5/12/2025    Therapy Diagnosis:   Encounter Diagnoses   Name Primary?    Generalized pain Yes    Generalized weakness     Diffuse arthralgia      Physician: Bharat Arechiga FNP    Physician Orders: Eval and Treat  Medical Diagnosis: Diffuse arthralgia  Generalized weakness    Visit # / Visits Authorized:  2 / 20  Insurance Authorization Period: 4/10/2025 to 12/31/2025  Date of Evaluation: 4/10/2025  Plan of Care Certification: 4/10/2025 to 7/3/25      PT/PTA: PTA   Number of PTA visits since last PT visit:1  Time In: 1000   Time Out: 1055  Total Time: 55   Total Billable Time: 30 with PTA     FOTO: 1/1   Intake Score:  %  Survey Score 1:  %  Survey Score 2:  %       standard      Subjective   she feels ok today. she has neck pain mostly at the center of the back neck..  Family / care giver present for this visit:  (daughter present in therapy session)  Pain reported as 5/10.      Objective            Treatment:  Therapeutic Exercise  TE 1: Pulleys flexion AAROM 2 min  TE 2: Pulleys abd AAROM 2 min  TE 3: supine chest press AAROM 3# wand 2x10  TE 4: supine shoulder flexion 2# wand AAROM 2x10  Balance/Neuromuscular Re-Education  NMR 1: scapular retraction x 20  NMR 2: shoulder rolls x 20  NMR 4: SAQ 2x10 hold 2 sec with 2lb on each leg (R still hurts more than L)  NMR 5: HL hip abd RTB 2x10  NMR 6: HL hip add 2x10 hold for 5 seconds  Therapeutic Activity  TA 1: Nustep 10 min Level 1    Time Entry(in minutes):       Assessment & Plan   Assessment: update progress note required next visit. So far, patient continues to have moderate pain to neck, shoulders, and R knee. Able to increase weights in supine wand and HL hip abduction exercise which patient responded well. moderate cueing for proper techniques and form in all exercise. Danielle was present throughout session as .  Patient did c/o central neck pain which possibly due to poor posture awareness. Explained and educated patient on postural awareness. May consider to add postural exercises to address her neck pain. Overall, will continue to work on generalized weakness, pain reduction, and endurance to improve patient's symptoms.       Patient will continue to benefit from skilled outpatient physical therapy to address the deficits listed in the problem list box on initial evaluation, provide pt/family education and to maximize pt's level of independence in the home and community environment.     Patient's spiritual, cultural, and educational needs considered and patient agreeable to plan of care and goals.           Plan: Cont with POC    Goals:   Active       LTGs        Increase ROM to allow improved joint biomechanics during functional activities.          Start:  04/10/25    Expected End:  07/03/25            2. Independent with home exercise program.         Start:  04/10/25    Expected End:  07/03/25            3. Full return to functional activities with manageable complaints.         Start:  04/10/25    Expected End:  07/03/25            4. Patient to demonstrate improved posture and body mechanics.         Start:  04/10/25    Expected End:  07/03/25            5.Decrease pain by 75% during functional activities.         Start:  04/10/25    Expected End:  07/03/25               STGs       Pt to increase strength by a 1/2 grade of muscles test to allow for improvement in functional activities such as performing chores.          Start:  04/10/25    Expected End:  07/03/25            2. Pt to improve range of motion by 25% to allow for improved functional mobility to allow for improvement in IADL's.         Start:  04/10/25    Expected End:  07/03/25            3. Pt to report compliance with HEP and demonstrate proper exercise technique to PT to show competence with self management of condition.         Start:  04/10/25     Expected End:  07/03/25            4. Decrease pain by 25% during functional activities.         Start:  04/10/25    Expected End:  07/03/25                Fatuma Mosquera PTA

## 2025-05-19 ENCOUNTER — CLINICAL SUPPORT (OUTPATIENT)
Dept: REHABILITATION | Facility: HOSPITAL | Age: 70
End: 2025-05-19
Payer: MEDICARE

## 2025-05-19 DIAGNOSIS — R52 GENERALIZED PAIN: Primary | ICD-10-CM

## 2025-05-19 PROCEDURE — 97112 NEUROMUSCULAR REEDUCATION: CPT | Mod: PN

## 2025-05-19 PROCEDURE — 97530 THERAPEUTIC ACTIVITIES: CPT | Mod: PN

## 2025-05-19 NOTE — PROGRESS NOTES
Outpatient Rehab    Physical Therapy Progress Note    Patient Name: Chelsea Geronimo  MRN: [id number]  YOB: 1955  Encounter Date: 5/19/2025    Therapy Diagnosis:   Encounter Diagnosis   Name Primary?    Generalized pain Yes     Physician: Bharat Arechiga FNP    Physician Orders: Eval and Treat  Medical Diagnosis: Diffuse arthralgia  Generalized weakness    Visit # / Visits Authorized:  3 / 20  Insurance Authorization Period: 4/10/2025 to 12/31/2025  Date of Evaluation: 4/10/2025  Plan of Care Certification: 4/10/2025 to 7/3/2025      PT/PTA:     Number of PTA visits since last PT visit:   Time In: 1300   Time Out: 1355  Total Time (in minutes): 55   Total Billable Time (in minutes): 40    FOTO:  Intake Score:  %  Survey Score 2:  %  Survey Score 3:  %    Precautions:       Subjective   Pt states she feels the same wtih neck and knee pain..  Family / care giver present for this visit:   Pain reported as 5/10.      Objective      Subcranial Range of Motion   Active Restricted? Passive Restricted? Pain   Flexion         Protraction         Retraction           Cervical Range of Motion   Active (deg) Passive (deg) Pain   Flexion  (Min loss with pain)   Yes   Extension  (Min loss with pain)   Yes   Right Lateral Flexion  (Min loss with pain)   Yes   Right Rotation  (Min loss with pain)   Yes   Left Lateral Flexion  (Min loss with pain)   Yes   Left Rotation  (Min loss with pain)   Yes                  Treatment:  Therapeutic Exercise  TE 1: Pulleys flexion AAROM 2 min  TE 2: Pulleys abd AAROM 2 min  TE 3: supine chest press AAROM 3# wand 2x10  TE 4: supine shoulder flexion 2# wand AAROM 2x10  Balance/Neuromuscular Re-Education  NMR 1: shoulder extension YTB 2x10  NMR 2: shoulder rolls x 20  NMR 3: quad sets 2x10 hold 5 sec  NMR 4: SAQ 2x10 hold 2 sec with 2lb on each leg (R still hurts more than L)  NMR 5: Matrix hip abd 10#  NMR 6: Matrix hip add 10# 2x10  NMR 7: Scap retraction YTB 2x10  Therapeutic  Activity  TA 1: Nustep 10 min Level 1    Time Entry(in minutes):  Neuromuscular Re-Education Time Entry: 30  Therapeutic Activity Time Entry: 10    Assessment & Plan   Assessment: Progression of exericses to standing shoulder extension, scap retraction, matrix hip abd, and matrix hip add. Pt required mod v/c's to perform exericses. Pt demonstrated decrease postural msucles and decrease deep cervical msucles. Pt has decrease B quad muscles strength and muscles endruance. Plan to cont with POC.  Evaluation/Treatment Tolerance: Patient tolerated treatment well    Patient will continue to benefit from skilled outpatient physical therapy to address the deficits listed in the problem list box on initial evaluation, provide pt/family education and to maximize pt's level of independence in the home and community environment.     Patient's spiritual, cultural, and educational needs considered and patient agreeable to plan of care and goals.           Plan: Cont with POC    Goals:   Active       LTGs        Increase ROM to allow improved joint biomechanics during functional activities.          Start:  04/10/25    Expected End:  07/03/25            2. Independent with home exercise program.         Start:  04/10/25    Expected End:  07/03/25            3. Full return to functional activities with manageable complaints.         Start:  04/10/25    Expected End:  07/03/25            4. Patient to demonstrate improved posture and body mechanics.         Start:  04/10/25    Expected End:  07/03/25            5.Decrease pain by 75% during functional activities.         Start:  04/10/25    Expected End:  07/03/25               STGs       Pt to increase strength by a 1/2 grade of muscles test to allow for improvement in functional activities such as performing chores.          Start:  04/10/25    Expected End:  07/03/25            2. Pt to improve range of motion by 25% to allow for improved functional mobility to allow for  improvement in IADL's.         Start:  04/10/25    Expected End:  07/03/25            3. Pt to report compliance with HEP and demonstrate proper exercise technique to PT to show competence with self management of condition.         Start:  04/10/25    Expected End:  07/03/25            4. Decrease pain by 25% during functional activities.         Start:  04/10/25    Expected End:  07/03/25                David Mitchell, PT

## 2025-05-27 ENCOUNTER — CLINICAL SUPPORT (OUTPATIENT)
Dept: REHABILITATION | Facility: HOSPITAL | Age: 70
End: 2025-05-27
Payer: MEDICARE

## 2025-05-27 DIAGNOSIS — R52 GENERALIZED PAIN: Primary | ICD-10-CM

## 2025-05-27 PROCEDURE — 97530 THERAPEUTIC ACTIVITIES: CPT | Mod: PN

## 2025-05-27 PROCEDURE — 97112 NEUROMUSCULAR REEDUCATION: CPT | Mod: PN

## 2025-05-27 NOTE — PROGRESS NOTES
Outpatient Rehab    Physical Therapy Visit    Patient Name: Chelsea Geronimo  MRN: [id number]  YOB: 1955  Encounter Date: 5/27/2025    Therapy Diagnosis:   Encounter Diagnosis   Name Primary?    Generalized pain Yes     Physician: Bharat Arechiga FNP    Physician Orders: Eval and Treat  Medical Diagnosis: Diffuse arthralgia  Generalized weakness    Visit # / Visits Authorized:  4 / 20  Insurance Authorization Period: 4/10/2025 to 12/31/2025  Date of Evaluation: 4/10/2025  Plan of Care Certification: 4/10/2025 to 7/3/2025      PT/PTA:     Number of PTA visits since last PT visit:   Time In: 0900   Time Out: 0955  Total Time (in minutes): 55   Total Billable Time (in minutes): 40    FOTO:  Intake Score:  %  Survey Score 2:  %  Survey Score 3:  %    Precautions:     Standard and fall risk      Subjective   Pt states she feels the same wtih neck and knee pain..  Family / care giver present for this visit:   Pain reported as 5/10.      Objective            Treatment:  Therapeutic Exercise  TE 1: Pulleys flexion AAROM 2 min  TE 2: Pulleys abd AAROM 2 min  TE 3: supine chest press AAROM 3# wand 2x10  TE 4: supine shoulder flexion 2# wand AAROM 2x10  Balance/Neuromuscular Re-Education  NMR 1: shoulder extension YTB 2x10  NMR 2: shoulder rolls x 20  NMR 3: quad sets 2x10 hold 5 sec  NMR 4: SAQ 2x10 hold 2 sec with 2lb on each leg (R still hurts more than L)  NMR 5: Matrix hip abd 10#  NMR 6: Matrix hip add 10# 2x10  NMR 7: Scap retraction YTB 2x10  Therapeutic Activity  TA 1: Nustep 10 min Level 1    Time Entry(in minutes):  Neuromuscular Re-Education Time Entry: 30  Therapeutic Activity Time Entry: 10    Assessment & Plan   Assessment: Pt has been in [facility]terpay over 4 weeks. Pt has been feeling the same since in\[facility]\"" eval. She cont with neck, shoulders and knee pain. Pain is somewhat limting factor during therpay. Pt has an appt with her M.D. Pt might benefit from further intervetion to decrease pain. Plan to place  therapy on hold until pt return to M.D  Evaluation/Treatment Tolerance: Patient limited by pain    The patient will continue to benefit from skilled outpatient physical therapy in order to address the deficits listed in the problem list on the initial evaluation, provide patient and family education, and maximize the patients level of independence in the home and community environments.     The patient's spiritual, cultural, and educational needs were considered, and the patient is agreeable to the plan of care and goals.           Plan: Plan to place therapy on hold until pt return to M.D    Goals:   Active       LTGs        Increase ROM to allow improved joint biomechanics during functional activities.          Start:  04/10/25    Expected End:  07/03/25            2. Independent with home exercise program.         Start:  04/10/25    Expected End:  07/03/25            3. Full return to functional activities with manageable complaints.         Start:  04/10/25    Expected End:  07/03/25            4. Patient to demonstrate improved posture and body mechanics.         Start:  04/10/25    Expected End:  07/03/25            5.Decrease pain by 75% during functional activities.         Start:  04/10/25    Expected End:  07/03/25               STGs       Pt to increase strength by a 1/2 grade of muscles test to allow for improvement in functional activities such as performing chores.          Start:  04/10/25    Expected End:  07/03/25            2. Pt to improve range of motion by 25% to allow for improved functional mobility to allow for improvement in IADL's.         Start:  04/10/25    Expected End:  07/03/25            3. Pt to report compliance with HEP and demonstrate proper exercise technique to PT to show competence with self management of condition.         Start:  04/10/25    Expected End:  07/03/25            4. Decrease pain by 25% during functional activities.         Start:  04/10/25    Expected End:   07/03/25                David Mitchell, PT